# Patient Record
Sex: MALE | Race: WHITE | URBAN - METROPOLITAN AREA
[De-identification: names, ages, dates, MRNs, and addresses within clinical notes are randomized per-mention and may not be internally consistent; named-entity substitution may affect disease eponyms.]

---

## 2021-02-24 ENCOUNTER — OFFICE VISIT (OUTPATIENT)
Dept: FAMILY MEDICINE | Facility: CLINIC | Age: 51
End: 2021-02-24

## 2021-02-24 VITALS
SYSTOLIC BLOOD PRESSURE: 110 MMHG | DIASTOLIC BLOOD PRESSURE: 68 MMHG | HEART RATE: 76 BPM | TEMPERATURE: 98.3 F | OXYGEN SATURATION: 96 % | HEIGHT: 68 IN | WEIGHT: 180 LBS | BODY MASS INDEX: 27.28 KG/M2

## 2021-02-24 DIAGNOSIS — Z72.0 TOBACCO USE: ICD-10-CM

## 2021-02-24 DIAGNOSIS — Z76.89 HEALTH CARE HOME: ICD-10-CM

## 2021-02-24 DIAGNOSIS — Z01.818 PRE-OPERATIVE EXAMINATION: Primary | ICD-10-CM

## 2021-02-24 DIAGNOSIS — Z71.89 ACP (ADVANCE CARE PLANNING): ICD-10-CM

## 2021-02-24 DIAGNOSIS — S83.241D ACUTE MEDIAL MENISCUS TEAR OF RIGHT KNEE, SUBSEQUENT ENCOUNTER: ICD-10-CM

## 2021-02-24 LAB
ERYTHROCYTE [DISTWIDTH] IN BLOOD BY AUTOMATED COUNT: 11.5 %
HCT VFR BLD AUTO: 51.9 % (ref 40–53)
HEMOGLOBIN: 16.8 G/DL (ref 13.3–17.7)
MCH RBC QN AUTO: 32.9 PG (ref 26–33)
MCHC RBC AUTO-ENTMCNC: 32.4 G/DL (ref 31–36)
MCV RBC AUTO: 101.5 FL (ref 78–100)
PLATELET COUNT - QUEST: 215 10^9/L (ref 150–375)
RBC # BLD AUTO: 5.11 10*12/L (ref 4.4–5.9)
WBC # BLD AUTO: 8.6 10*9/L (ref 4–11)

## 2021-02-24 PROCEDURE — 90715 TDAP VACCINE 7 YRS/> IM: CPT | Performed by: PHYSICIAN ASSISTANT

## 2021-02-24 PROCEDURE — 36415 COLL VENOUS BLD VENIPUNCTURE: CPT | Performed by: PHYSICIAN ASSISTANT

## 2021-02-24 PROCEDURE — 90471 IMMUNIZATION ADMIN: CPT | Performed by: PHYSICIAN ASSISTANT

## 2021-02-24 PROCEDURE — 99204 OFFICE O/P NEW MOD 45 MIN: CPT | Mod: 25 | Performed by: PHYSICIAN ASSISTANT

## 2021-02-24 PROCEDURE — 85027 COMPLETE CBC AUTOMATED: CPT | Performed by: PHYSICIAN ASSISTANT

## 2021-02-24 ASSESSMENT — MIFFLIN-ST. JEOR: SCORE: 1655.97

## 2021-02-24 NOTE — LETTER
[facility]  [address] [place]  [address]  [facility] [address]  [phone]      February 24, 2021      Nestor MCCABE [facility]  [address] [facility] [address]      EMERGENCY CARE PLAN  Presenting Problem Treatment Plan   Questions or concerns during clinic hours I will call the clinic directly:    [facility]  [address] , [address]  [place] [address]  [phone]   Questions or concerns outside clinic hours  I will call the 24 hour line at [phone]   Patient needs to schedule an appointment  I will call the  scheduling line at [phone]   Same day treatment   I will call the clinic first, then  urgent care and/or  express care if needed   Clinic Care Coordinators Milly Morales RN:  [phone]  [facility] Clinical Support Staff: [phone]    Crisis Services:  Behavioral or Mental Health P ([facility] Providers)   [phone]   Emergency treatment--Immediately CALL [phone]

## 2021-02-24 NOTE — PROGRESS NOTES
[facility]  [address] [place] [address]  [address]  [facility] [address]  Phone: [phone]  Fax: [phone]  Primary Provider: Lorena Carmichael  Pre-op Performing Provider: LORENA CARMICHAEL      PREOPERATIVE EVALUATION:  Today's date: 2/24/2021    Nestor Pires is a 50 year old male who presents for a preoperative evaluation.    Surgical Information:  Surgery/Procedure: Right meniscus knee repair surgery  Surgery Location: [facility]  Surgeon: Dr. Angelito Skelton  Surgery Date: 3/3/2021  Time of Surgery: 10 AM  Where patient plans to recover: At home with family  Fax number for surgical facility: [phone]    Type of Anesthesia Anticipated: to be determined    Assessment & Plan     The proposed surgical procedure is considered INTERMEDIATE risk.    Pre-operative examination  Acute medial meniscus tear of right knee, subsequent encounter  Nestor is doing well today. CBC WNL.  - VENOUS COLLECTION  - Hemogram Platelet (BFP)    Tobacco use  Did not tolerate Zyban. May consider patches Chantix in the future. Encouraged him to return in near future for fasting physical.     Health Care Home    ACP (advance care planning)      Risks and Recommendations:  The patient has the following additional risks and recommendations for perioperative complications:   - No identified additional risk factors other than previously addressed    Medication Instructions:  Patient is on no chronic medications    RECOMMENDATION:  APPROVAL GIVEN to proceed with proposed procedure, without further diagnostic evaluation.    25 minutes spent on the date of the encounter doing chart review, review of outside records, review of test results, patient visit and documentation     Subjective     HPI related to upcoming procedure:   Few weeks ago injured right knee working at [employer], and felt immediate pain. Visited local medical facility who did x-ray that was negative. Followed-up with ortho back  home, and did MRI that showed meniscus damage.    1. No - Have you ever had a heart attack or stroke?  2. No - Have you ever had surgery on your heart or blood vessels, such as a stent, coronary (heart) bypass, or surgery on an artery in the head, neck, heart, or legs?  3. No - Do you have chest pain when you are physically active?  4. No - Do you have a history of heart failure?  5. No - Do you currently have a cold, bronchitis, or symptoms of other respiratory (head and chest) infections?  6. No - Do you have a cough, shortness of breath, or wheezing?  7. No - Do you or anyone in your family have a history of blood clots?  8. No - Do you or anyone in your family have a serious bleeding problem, such as long-lasting bleeding after surgeries or cuts?  9. No - Have you ever had anemia or been told to take iron pills?  10. No - Have you had any abnormal blood loss such as black, tarry or bloody stools, or abnormal vaginal bleeding?  11. No - Have you ever had a blood transfusion?  12. Yes - Are you willing to have a blood transfusion if it is medically needed before, during, or after your surgery?  13. No - Have you or anyone in your family ever had problems with anesthesia (sedation for surgery)?  14. No - Do you have sleep apnea, excessive snoring, or daytime drowsiness?   15. No - Do you have any artifical heart valves or other implanted medical devices, such as a pacemaker, defibrillator, or continuous glucose monitor?  16. No - Do you have any artifical joints?  17. No - Are you allergic to latex?  18. No - Is there any chance that you may be pregnant?    Health Care Directive:  Patient does not have a Health Care Directive or Living Will: Patient states has Advance Directive and will bring in a copy to clinic.    Status of Chronic Conditions:  See problem list for active medical problems.  Problems all longstanding and stable, except as noted/documented.  See ROS for pertinent symptoms related to these  "conditions.    Review of Systems  Constitutional, neuro, ENT, endocrine, pulmonary, cardiac, gastrointestinal, genitourinary, musculoskeletal, integument and psychiatric systems are negative, except as otherwise noted.    Patient Active Problem List    Diagnosis Date Noted     Tobacco use 02/24/2021     Priority: Medium     Health Care Home 02/24/2021     Priority: Low     ACP (advance care planning) 02/24/2021     Priority: Low      No past medical history on file.  Past Surgical History:   Procedure Laterality Date     NO HISTORY OF SURGERY       No current outpatient medications on file.       Allergies   Allergen Reactions     Zyban### [Bupropion]      Insomnia, vivid dreams        Social History     Tobacco Use     Smoking status: Current Every Day Smoker     Packs/day: 1.00     Start date: 1/1/1989     Smokeless tobacco: Never Used   Substance Use Topics     Alcohol use: Not on file     Family History   Problem Relation Age of Onset     Dementia Mother      Alzheimer Disease Maternal Grandmother      Breast Cancer Paternal Grandmother         70s       History   Drug Use Not on file         Objective     /68 (BP Location: Right arm, Patient Position: Sitting, Cuff Size: Adult Large)   Pulse 76   Temp 98.3  F (36.8  C) (Oral)   Ht 1.727 m (5' 8\")   Wt 81.6 kg (180 lb)   SpO2 96%   BMI 27.37 kg/m      Physical Exam    GENERAL APPEARANCE: healthy, alert and no distress     EYES: EOMI,  PERRL     HENT: ear canals and TM's normal and nose and mouth without ulcers or lesions     NECK: no adenopathy, no asymmetry, masses, or scars and thyroid normal to palpation     RESP: lungs clear to auscultation - no rales, rhonchi or wheezes     CV: regular rates and rhythm, normal S1 S2, no S3 or S4 and no murmur, click or rub     ABDOMEN:  soft, nontender, no HSM or masses and bowel sounds normal     MS: extremities normal- no gross deformities noted, no evidence of inflammation in joints, FROM in all " extremities.     SKIN: no suspicious lesions or rashes     NEURO: Normal strength and tone, sensory exam grossly normal, mentation intact and speech normal     PSYCH: mentation appears normal. and affect normal/bright     LYMPHATICS: No cervical adenopathy    No results for input(s): HGB, PLT, INR, NA, POTASSIUM, CR, A1C in the last 39940 hours.     Diagnostics:  Recent Results (from the past 24 hour(s))   Hemogram Platelet (BFP)    Collection Time: 02/24/21 12:00 AM   Result Value Ref Range    WBC 8.6 4.0 - 11 10*9/L    RBC Count 5.11 4.4 - 5.9 10*12/L    Hemoglobin 16.8 13.3 - 17.7 g/dL    Hematocrit 51.9 40.0 - 53.0 %    .5 (A) 78 - 100 fL    MCH 32.9 26 - 33 pg    MCHC 32.4 31 - 36 g/dL    RDW 11.5 %    Platelet Count 215 150 - 375 10^9/L      No EKG required, no history of coronary heart disease, significant arrhythmia, peripheral arterial disease or other structural heart disease.    Revised Cardiac Risk Index (RCRI):  The patient has the following serious cardiovascular risks for perioperative complications:   - No serious cardiac risks = 0 points     RCRI Interpretation: 0 points: Class I (very low risk - 0.4% complication rate)    Signed Electronically by: Lorena Houser PA-C  Copy of this evaluation report is provided to requesting physician.    [facility] Guidelines    Revised Cardiac Risk Index

## 2021-02-24 NOTE — LETTER
[facility]  [address] [place] [address]  [address]  [facility] [address]  Phone: [phone]  Fax: [phone]  Primary Provider: Lorena Carmichael  Pre-op Performing Provider: LORENA CARMICHAEL      PREOPERATIVE EVALUATION:  Today's date: 2/24/2021    Nestor Pires is a 50 year old male who presents for a preoperative evaluation.    Surgical Information:  Surgery/Procedure: Right meniscus knee repair surgery  Surgery Location: [facility]  Surgeon: Dr. Angelito Skelton  Surgery Date: 3/3/2021  Time of Surgery: 10 AM  Where patient plans to recover: At home with family  Fax number for surgical facility: [phone]    Type of Anesthesia Anticipated: to be determined    Assessment & Plan     The proposed surgical procedure is considered INTERMEDIATE risk.    Pre-operative examination  Acute medial meniscus tear of right knee, subsequent encounter  Nestor is doing well today. CBC WNL.  - VENOUS COLLECTION  - Hemogram Platelet (BFP)    Tobacco use  Did not tolerate Zyban. May consider patches Chantix in the future. Encouraged him to return in near future for fasting physical.     Health Care Home    ACP (advance care planning)      Risks and Recommendations:  The patient has the following additional risks and recommendations for perioperative complications:   - No identified additional risk factors other than previously addressed    Medication Instructions:  Patient is on no chronic medications    RECOMMENDATION:  APPROVAL GIVEN to proceed with proposed procedure, without further diagnostic evaluation.    25 minutes spent on the date of the encounter doing chart review, review of outside records, review of test results, patient visit and documentation     Subjective     HPI related to upcoming procedure:   Few weeks ago injured right knee working at [employer], and felt immediate pain. Visited local medical facility who did x-ray that was negative. Followed-up with ortho back  home, and did MRI that showed meniscus damage.    1. No - Have you ever had a heart attack or stroke?  2. No - Have you ever had surgery on your heart or blood vessels, such as a stent, coronary (heart) bypass, or surgery on an artery in the head, neck, heart, or legs?  3. No - Do you have chest pain when you are physically active?  4. No - Do you have a history of heart failure?  5. No - Do you currently have a cold, bronchitis, or symptoms of other respiratory (head and chest) infections?  6. No - Do you have a cough, shortness of breath, or wheezing?  7. No - Do you or anyone in your family have a history of blood clots?  8. No - Do you or anyone in your family have a serious bleeding problem, such as long-lasting bleeding after surgeries or cuts?  9. No - Have you ever had anemia or been told to take iron pills?  10. No - Have you had any abnormal blood loss such as black, tarry or bloody stools, or abnormal vaginal bleeding?  11. No - Have you ever had a blood transfusion?  12. Yes - Are you willing to have a blood transfusion if it is medically needed before, during, or after your surgery?  13. No - Have you or anyone in your family ever had problems with anesthesia (sedation for surgery)?  14. No - Do you have sleep apnea, excessive snoring, or daytime drowsiness?   15. No - Do you have any artifical heart valves or other implanted medical devices, such as a pacemaker, defibrillator, or continuous glucose monitor?  16. No - Do you have any artifical joints?  17. No - Are you allergic to latex?  18. No - Is there any chance that you may be pregnant?    Health Care Directive:  Patient does not have a Health Care Directive or Living Will: Patient states has Advance Directive and will bring in a copy to clinic.    Status of Chronic Conditions:  See problem list for active medical problems.  Problems all longstanding and stable, except as noted/documented.  See ROS for pertinent symptoms related to these  "conditions.    Review of Systems  Constitutional, neuro, ENT, endocrine, pulmonary, cardiac, gastrointestinal, genitourinary, musculoskeletal, integument and psychiatric systems are negative, except as otherwise noted.    Patient Active Problem List    Diagnosis Date Noted     Tobacco use 02/24/2021     Priority: Medium     Health Care Home 02/24/2021     Priority: Low     ACP (advance care planning) 02/24/2021     Priority: Low      No past medical history on file.  Past Surgical History:   Procedure Laterality Date     NO HISTORY OF SURGERY       No current outpatient medications on file.       Allergies   Allergen Reactions     Zyban### [Bupropion]      Insomnia, vivid dreams        Social History     Tobacco Use     Smoking status: Current Every Day Smoker     Packs/day: 1.00     Start date: 1/1/1989     Smokeless tobacco: Never Used   Substance Use Topics     Alcohol use: Not on file     Family History   Problem Relation Age of Onset     Dementia Mother      Alzheimer Disease Maternal Grandmother      Breast Cancer Paternal Grandmother         70s       History   Drug Use Not on file         Objective     /68 (BP Location: Right arm, Patient Position: Sitting, Cuff Size: Adult Large)   Pulse 76   Temp 98.3  F (36.8  C) (Oral)   Ht 1.727 m (5' 8\")   Wt 81.6 kg (180 lb)   SpO2 96%   BMI 27.37 kg/m      Physical Exam    GENERAL APPEARANCE: healthy, alert and no distress     EYES: EOMI,  PERRL     HENT: ear canals and TM's normal and nose and mouth without ulcers or lesions     NECK: no adenopathy, no asymmetry, masses, or scars and thyroid normal to palpation     RESP: lungs clear to auscultation - no rales, rhonchi or wheezes     CV: regular rates and rhythm, normal S1 S2, no S3 or S4 and no murmur, click or rub     ABDOMEN:  soft, nontender, no HSM or masses and bowel sounds normal     MS: extremities normal- no gross deformities noted, no evidence of inflammation in joints, FROM in all " extremities.     SKIN: no suspicious lesions or rashes     NEURO: Normal strength and tone, sensory exam grossly normal, mentation intact and speech normal     PSYCH: mentation appears normal. and affect normal/bright     LYMPHATICS: No cervical adenopathy    No results for input(s): HGB, PLT, INR, NA, POTASSIUM, CR, A1C in the last 67192 hours.     Diagnostics:  Recent Results (from the past 24 hour(s))   Hemogram Platelet (BFP)    Collection Time: 02/24/21 12:00 AM   Result Value Ref Range    WBC 8.6 4.0 - 11 10*9/L    RBC Count 5.11 4.4 - 5.9 10*12/L    Hemoglobin 16.8 13.3 - 17.7 g/dL    Hematocrit 51.9 40.0 - 53.0 %    .5 (A) 78 - 100 fL    MCH 32.9 26 - 33 pg    MCHC 32.4 31 - 36 g/dL    RDW 11.5 %    Platelet Count 215 150 - 375 10^9/L      No EKG required, no history of coronary heart disease, significant arrhythmia, peripheral arterial disease or other structural heart disease.    Revised Cardiac Risk Index (RCRI):  The patient has the following serious cardiovascular risks for perioperative complications:   - No serious cardiac risks = 0 points     RCRI Interpretation: 0 points: Class I (very low risk - 0.4% complication rate)    Signed Electronically by: Lorena Houser PA-C  Copy of this evaluation report is provided to requesting physician.    [facility] Guidelines    Revised Cardiac Risk Index

## 2021-09-20 ENCOUNTER — OFFICE VISIT (OUTPATIENT)
Dept: FAMILY MEDICINE | Facility: CLINIC | Age: 51
End: 2021-09-20

## 2021-09-20 VITALS
HEART RATE: 88 BPM | DIASTOLIC BLOOD PRESSURE: 72 MMHG | SYSTOLIC BLOOD PRESSURE: 116 MMHG | HEIGHT: 68 IN | OXYGEN SATURATION: 92 % | WEIGHT: 185 LBS | BODY MASS INDEX: 28.04 KG/M2

## 2021-09-20 DIAGNOSIS — Z23 NEED FOR VACCINATION: ICD-10-CM

## 2021-09-20 DIAGNOSIS — Z00.00 ROUTINE GENERAL MEDICAL EXAMINATION AT A HEALTH CARE FACILITY: Primary | ICD-10-CM

## 2021-09-20 DIAGNOSIS — Z12.11 SPECIAL SCREENING FOR MALIGNANT NEOPLASMS, COLON: ICD-10-CM

## 2021-09-20 DIAGNOSIS — Z13.220 LIPID SCREENING: ICD-10-CM

## 2021-09-20 DIAGNOSIS — Z71.6 ENCOUNTER FOR SMOKING CESSATION COUNSELING: ICD-10-CM

## 2021-09-20 DIAGNOSIS — Z12.5 SCREENING FOR PROSTATE CANCER: ICD-10-CM

## 2021-09-20 LAB
CHOLEST SERPL-MCNC: 187 MG/DL (ref 0–199)
CHOLEST/HDLC SERPL: 4 {RATIO} (ref 0–5)
GLUCOSE SERPL-MCNC: 96 MG/DL (ref 60–99)
HDLC SERPL-MCNC: 42 MG/DL (ref 40–150)
LDLC SERPL CALC-MCNC: 131 MG/DL (ref 0–130)
TRIGL SERPL-MCNC: 70 MG/DL (ref 0–149)

## 2021-09-20 PROCEDURE — 82947 ASSAY GLUCOSE BLOOD QUANT: CPT | Performed by: STUDENT IN AN ORGANIZED HEALTH CARE EDUCATION/TRAINING PROGRAM

## 2021-09-20 PROCEDURE — 99396 PREV VISIT EST AGE 40-64: CPT | Performed by: STUDENT IN AN ORGANIZED HEALTH CARE EDUCATION/TRAINING PROGRAM

## 2021-09-20 PROCEDURE — 36415 COLL VENOUS BLD VENIPUNCTURE: CPT | Performed by: STUDENT IN AN ORGANIZED HEALTH CARE EDUCATION/TRAINING PROGRAM

## 2021-09-20 PROCEDURE — 80061 LIPID PANEL: CPT | Performed by: STUDENT IN AN ORGANIZED HEALTH CARE EDUCATION/TRAINING PROGRAM

## 2021-09-20 ASSESSMENT — MIFFLIN-ST. JEOR: SCORE: 1673.65

## 2021-09-20 NOTE — PROGRESS NOTES
"  SUBJECTIVE:   CC: Nestor Pires is an 51 year old male who presents for preventative health visit.     HPI  Good health overall  Exercising less due to chronic knee pain, does go to gym  Works building small engines  Sleeps well  Home safety:  none identified     Today's PHQ-2 Score:   PHQ-2 ( 1999 [employer]) 2/24/2021   Q1: Little interest or pleasure in doing things 0   Q2: Feeling down, depressed or hopeless 0   PHQ-2 Score 0       Social History     Tobacco Use     Smoking status: Current Every Day Smoker     Packs/day: 1.00     Start date: 1/1/1989     Smokeless tobacco: Never Used   Substance Use Topics     Alcohol use: Not on file       No flowsheet data found.     Last PSA: No results found for: PSA    Reviewed orders with patient. Reviewed health maintenance and updated orders accordingly - Yes  BP Readings from Last 3 Encounters:   09/20/21 116/72   02/24/21 110/68    Wt Readings from Last 3 Encounters:   09/20/21 83.9 kg (185 lb)   02/24/21 81.6 kg (180 lb)                    Reviewed and updated as needed this visit by clinical staff   Allergies  Meds              Reviewed and updated as needed this visit by Provider                    Review of Systems  12 point ROS performed and negative for new concerns except as mentioned above     OBJECTIVE:   /72 (BP Location: Left arm, Patient Position: Sitting, Cuff Size: Adult Large)   Pulse 88   Ht 1.727 m (5' 8\")   Wt 83.9 kg (185 lb)   SpO2 92%   BMI 28.13 kg/m      Physical Exam  GENERAL: healthy, alert and no distress  EYES: Eyes grossly normal to inspection, PERRL and conjunctivae and sclerae normal  HENT: ear canals and TM's normal, nose and mouth without ulcers or lesions  NECK: no adenopathy, no asymmetry, masses, or scars and thyroid normal to palpation  RESP: lungs clear to auscultation - no rales, rhonchi or wheezes  CV: regular rate and rhythm, normal S1 S2, no S3 or S4, no murmur, click or rub, no peripheral edema and peripheral pulses " "strong  ABDOMEN: soft, nontender, no hepatosplenomegaly, no masses and bowel sounds normal  MS: no gross musculoskeletal defects noted, no edema  SKIN: no suspicious lesions or rashes  NEURO: Normal strength and tone, mentation intact and speech normal  PSYCH: mentation appears normal, affect normal/bright      ASSESSMENT/PLAN:       ICD-10-CM    1. Routine general medical examination at a health care facility  Z00.00 Glucose Fasting (BFP)   2. Lipid screening  Z13.220 Lipid Panel (BFP)   3. Screening for prostate cancer  Z12.5 PSA Total ([facility])   4. Special screening for malignant neoplasms, colon  Z12.11 Adult Gastro Ref - Procedure Only     COUNSELING:   Reviewed preventive health counseling, as reflected in patient instructions       Smoking cessation resources reviewed at length, follow-up any time       Regular exercise       Healthy diet/nutrition       Immunizations    Declined all       Alcohol Use        Safe sex practices/STD prevention       Consider Hep C screening for all patients one time for ages 18-79 years       HIV screeninx in teen years, 1x in adult years, and at intervals if high risk       Colon cancer screening       Prostate cancer screening    Estimated body mass index is 28.13 kg/m  as calculated from the following:    Height as of this encounter: 1.727 m (5' 8\").    Weight as of this encounter: 83.9 kg (185 lb).     Weight management plan: Discussed healthy diet and exercise guidelines    He reports that he has been smoking. He started smoking about 32 years ago. He has been smoking about 1.00 pack per day. He has never used smokeless tobacco.  Tobacco Cessation Action Plan:   Information offered: Patient not interested at this time      Joce Fleming MD, [facility]   "

## 2021-09-20 NOTE — LETTER
October 4, 2021      Nestor Pires  [address] [facility] [address]        Dear ,    I'm writing to inform you of your test results. Your cholesterol is just slightly elevated, otherwise everything looks good. Let me know if you have any questions. -Dr. Fleming     Resulted Orders   Glucose Fasting (BFP)   Result Value Ref Range    Glucose 96 60 - 99 mg/dL   Lipid Panel (BFP)   Result Value Ref Range    Cholesterol 187 0 - 199 mg/dL    Triglycerides 70 0 - 149 mg/dL    HDL Cholesterol 42 40 - 150 mg/dL    LDL Cholesterol Direct 131 (A) 0 - 130 mg/dL    Cholesterol/HDL Ratio 4 0 - 5   PSA Total ([facility])   Result Value Ref Range    Abbott PSA 0.88 < OR = 4.00 ng/mL      Comment:      The total PSA value from this assay system is   standardized against the WHO standard. The test   result will be approximately 20% lower when compared   to the equimolar-standardized total PSA (Lindsey   Keller). Comparison of serial PSA results should be   interpreted with this fact in mind.     This test was performed using the Siemens   chemiluminescent method. Values obtained from   different assay methods cannot be used  interchangeably. PSA levels, regardless of  value, should not be interpreted as absolute  evidence of the presence or absence of disease.          If you have any questions or concerns, please call the clinic at the number listed above.       Sincerely,      Joce Fleming MD

## 2021-09-20 NOTE — NURSING NOTE
Chief Complaint   Patient presents with     Physical     fasting, no concerns     Pre-Visit Screening:  Immunizations: Declined flu, COVID, zoster  Colonoscopy: due and ordered   Mammogram:   Asthma Action Test/Plan:  PHQ9: done today today  GAD7: done today  Questioned patient about current smoking habits Pt. currently  OK to leave a detailed message on voice mail for today's visit  YES, phone #

## 2021-09-21 LAB — ABBOTT PSA - QUEST: 0.88 NG/ML

## 2021-09-27 ENCOUNTER — TRANSFERRED RECORDS (OUTPATIENT)
Dept: FAMILY MEDICINE | Facility: CLINIC | Age: 51
End: 2021-09-27

## 2024-06-17 PROBLEM — Z76.89 HEALTH CARE HOME: Status: RESOLVED | Noted: 2021-02-24 | Resolved: 2024-06-17
